# Patient Record
Sex: MALE | Race: WHITE | NOT HISPANIC OR LATINO
[De-identification: names, ages, dates, MRNs, and addresses within clinical notes are randomized per-mention and may not be internally consistent; named-entity substitution may affect disease eponyms.]

---

## 2017-02-09 ENCOUNTER — APPOINTMENT (OUTPATIENT)
Dept: HEMATOLOGY ONCOLOGY | Facility: CLINIC | Age: 77
End: 2017-02-09

## 2017-05-11 ENCOUNTER — APPOINTMENT (OUTPATIENT)
Dept: HEMATOLOGY ONCOLOGY | Facility: CLINIC | Age: 77
End: 2017-05-11

## 2017-05-11 VITALS
DIASTOLIC BLOOD PRESSURE: 83 MMHG | TEMPERATURE: 97.9 F | BODY MASS INDEX: 28.13 KG/M2 | HEART RATE: 74 BPM | SYSTOLIC BLOOD PRESSURE: 164 MMHG | RESPIRATION RATE: 14 BRPM | WEIGHT: 185 LBS

## 2017-05-12 LAB
ALBUMIN SERPL ELPH-MCNC: 4.5 G/DL
ALP BLD-CCNC: 65 U/L
ALT SERPL-CCNC: 47 U/L
ANION GAP SERPL CALC-SCNC: 14 MMOL/L
AST SERPL-CCNC: 37 U/L
BILIRUB SERPL-MCNC: 0.6 MG/DL
BUN SERPL-MCNC: 12 MG/DL
CALCIUM SERPL-MCNC: 9.3 MG/DL
CHLORIDE SERPL-SCNC: 100 MMOL/L
CO2 SERPL-SCNC: 25 MMOL/L
CREAT SERPL-MCNC: 1.06 MG/DL
GLUCOSE SERPL-MCNC: 90 MG/DL
LDH SERPL-CCNC: 180 U/L
POTASSIUM SERPL-SCNC: 4.7 MMOL/L
PROT SERPL-MCNC: 6.7 G/DL
SODIUM SERPL-SCNC: 139 MMOL/L
URATE SERPL-MCNC: 7.4 MG/DL

## 2017-05-15 LAB
DEPRECATED KAPPA LC FREE/LAMBDA SER: 1.3 RATIO
IGA SER QL IEP: 10 MG/DL
IGG SER QL IEP: 915 MG/DL
IGM SER QL IEP: 9 MG/DL
KAPPA LC CSF-MCNC: 0.54 MG/DL
KAPPA LC SERPL-MCNC: 0.7 MG/DL

## 2017-08-09 ENCOUNTER — LABORATORY RESULT (OUTPATIENT)
Age: 77
End: 2017-08-09

## 2017-08-09 ENCOUNTER — APPOINTMENT (OUTPATIENT)
Dept: HEMATOLOGY ONCOLOGY | Facility: CLINIC | Age: 77
End: 2017-08-09
Payer: MEDICARE

## 2017-08-09 PROCEDURE — 85025 COMPLETE CBC W/AUTO DIFF WBC: CPT

## 2017-08-09 PROCEDURE — 99215 OFFICE O/P EST HI 40 MIN: CPT

## 2017-08-10 LAB
ALBUMIN SERPL ELPH-MCNC: 4.5 G/DL
ALP BLD-CCNC: 58 U/L
ALT SERPL-CCNC: 162 U/L
ANION GAP SERPL CALC-SCNC: 14 MMOL/L
AST SERPL-CCNC: 98 U/L
BILIRUB SERPL-MCNC: 0.9 MG/DL
BUN SERPL-MCNC: 9 MG/DL
CALCIUM SERPL-MCNC: 8.7 MG/DL
CHLORIDE SERPL-SCNC: 103 MMOL/L
CO2 SERPL-SCNC: 26 MMOL/L
CREAT SERPL-MCNC: 1.15 MG/DL
GLUCOSE SERPL-MCNC: 71 MG/DL
LDH SERPL-CCNC: 229 U/L
POTASSIUM SERPL-SCNC: 4.5 MMOL/L
PROT SERPL-MCNC: 6.7 G/DL
SODIUM SERPL-SCNC: 143 MMOL/L
URATE SERPL-MCNC: 7.3 MG/DL

## 2017-08-11 VITALS
WEIGHT: 185 LBS | DIASTOLIC BLOOD PRESSURE: 76 MMHG | BODY MASS INDEX: 28.13 KG/M2 | RESPIRATION RATE: 12 BRPM | TEMPERATURE: 97.8 F | OXYGEN SATURATION: 99 % | SYSTOLIC BLOOD PRESSURE: 130 MMHG | HEART RATE: 93 BPM

## 2017-08-11 LAB
DEPRECATED KAPPA LC FREE/LAMBDA SER: 3 RATIO
IGA SER QL IEP: 8 MG/DL
IGG SER QL IEP: 793 MG/DL
IGM SER QL IEP: 8 MG/DL
KAPPA LC CSF-MCNC: 0.12 MG/DL
KAPPA LC SERPL-MCNC: 0.36 MG/DL

## 2017-08-14 ENCOUNTER — RX RENEWAL (OUTPATIENT)
Age: 77
End: 2017-08-14

## 2017-11-08 ENCOUNTER — APPOINTMENT (OUTPATIENT)
Dept: HEMATOLOGY ONCOLOGY | Facility: CLINIC | Age: 77
End: 2017-11-08
Payer: MEDICARE

## 2017-11-08 VITALS
RESPIRATION RATE: 12 BRPM | OXYGEN SATURATION: 98 % | BODY MASS INDEX: 27.37 KG/M2 | SYSTOLIC BLOOD PRESSURE: 125 MMHG | DIASTOLIC BLOOD PRESSURE: 64 MMHG | WEIGHT: 180 LBS | TEMPERATURE: 97.8 F | HEART RATE: 80 BPM

## 2017-11-08 PROCEDURE — 85025 COMPLETE CBC W/AUTO DIFF WBC: CPT

## 2017-11-08 PROCEDURE — 99215 OFFICE O/P EST HI 40 MIN: CPT

## 2017-11-08 RX ORDER — INDOMETHACIN 50 MG/1
50 CAPSULE ORAL
Refills: 0 | Status: ACTIVE | COMMUNITY

## 2017-11-09 LAB
ALBUMIN SERPL ELPH-MCNC: 4.7 G/DL
ALP BLD-CCNC: 61 U/L
ALT SERPL-CCNC: 38 U/L
ANION GAP SERPL CALC-SCNC: 15 MMOL/L
AST SERPL-CCNC: 33 U/L
BILIRUB SERPL-MCNC: 0.9 MG/DL
BUN SERPL-MCNC: 12 MG/DL
CALCIUM SERPL-MCNC: 9.3 MG/DL
CHLORIDE SERPL-SCNC: 104 MMOL/L
CO2 SERPL-SCNC: 24 MMOL/L
CREAT SERPL-MCNC: 1.27 MG/DL
GLUCOSE SERPL-MCNC: 79 MG/DL
LDH SERPL-CCNC: 200 U/L
POTASSIUM SERPL-SCNC: 4.8 MMOL/L
PROT SERPL-MCNC: 7 G/DL
SODIUM SERPL-SCNC: 143 MMOL/L
URATE SERPL-MCNC: 7.7 MG/DL

## 2017-11-10 LAB
DEPRECATED KAPPA LC FREE/LAMBDA SER: 1.21 RATIO
IGA SER QL IEP: 10 MG/DL
IGG SER QL IEP: 846 MG/DL
IGM SER QL IEP: 14 MG/DL
KAPPA LC CSF-MCNC: 0.73 MG/DL
KAPPA LC SERPL-MCNC: 0.88 MG/DL

## 2018-02-08 ENCOUNTER — APPOINTMENT (OUTPATIENT)
Dept: HEMATOLOGY ONCOLOGY | Facility: CLINIC | Age: 78
End: 2018-02-08
Payer: MEDICARE

## 2018-02-08 PROCEDURE — 99215 OFFICE O/P EST HI 40 MIN: CPT

## 2018-02-08 PROCEDURE — 85025 COMPLETE CBC W/AUTO DIFF WBC: CPT

## 2018-02-08 RX ORDER — ACYCLOVIR 400 MG/1
400 TABLET ORAL
Qty: 120 | Refills: 5 | Status: DISCONTINUED | COMMUNITY
Start: 2018-01-29 | End: 2018-02-08

## 2018-02-09 VITALS
BODY MASS INDEX: 27.37 KG/M2 | HEART RATE: 74 BPM | RESPIRATION RATE: 14 BRPM | TEMPERATURE: 97.6 F | DIASTOLIC BLOOD PRESSURE: 76 MMHG | SYSTOLIC BLOOD PRESSURE: 118 MMHG | WEIGHT: 180 LBS | OXYGEN SATURATION: 99 %

## 2018-02-09 LAB
DEPRECATED KAPPA LC FREE/LAMBDA SER: 0.96 RATIO
IGA SER QL IEP: 10 MG/DL
IGG SER QL IEP: 1090 MG/DL
IGM SER QL IEP: 10 MG/DL
KAPPA LC CSF-MCNC: 0.23 MG/DL
KAPPA LC SERPL-MCNC: 0.22 MG/DL
LDH SERPL-CCNC: 188 U/L
URATE SERPL-MCNC: 7.8 MG/DL

## 2018-02-15 ENCOUNTER — RX RENEWAL (OUTPATIENT)
Age: 78
End: 2018-02-15

## 2018-05-09 ENCOUNTER — APPOINTMENT (OUTPATIENT)
Dept: HEMATOLOGY ONCOLOGY | Facility: CLINIC | Age: 78
End: 2018-05-09
Payer: MEDICARE

## 2018-05-09 VITALS
SYSTOLIC BLOOD PRESSURE: 122 MMHG | BODY MASS INDEX: 27.98 KG/M2 | DIASTOLIC BLOOD PRESSURE: 72 MMHG | HEART RATE: 87 BPM | OXYGEN SATURATION: 98 % | WEIGHT: 184 LBS | RESPIRATION RATE: 14 BRPM | TEMPERATURE: 97.6 F

## 2018-05-09 PROCEDURE — 99215 OFFICE O/P EST HI 40 MIN: CPT

## 2018-05-09 PROCEDURE — 85025 COMPLETE CBC W/AUTO DIFF WBC: CPT

## 2018-05-14 LAB
ALBUMIN SERPL ELPH-MCNC: 4.4 G/DL
ALP BLD-CCNC: 64 U/L
ALT SERPL-CCNC: 69 U/L
ANION GAP SERPL CALC-SCNC: 12 MMOL/L
AST SERPL-CCNC: 64 U/L
BILIRUB SERPL-MCNC: 0.6 MG/DL
BUN SERPL-MCNC: 13 MG/DL
CALCIUM SERPL-MCNC: 9.5 MG/DL
CHLORIDE SERPL-SCNC: 104 MMOL/L
CO2 SERPL-SCNC: 25 MMOL/L
CREAT SERPL-MCNC: 1.14 MG/DL
GLUCOSE SERPL-MCNC: 90 MG/DL
LDH SERPL-CCNC: 191 U/L
POTASSIUM SERPL-SCNC: 4.8 MMOL/L
PROT SERPL-MCNC: 6.9 G/DL
SODIUM SERPL-SCNC: 141 MMOL/L
URATE SERPL-MCNC: 7.9 MG/DL

## 2018-08-15 ENCOUNTER — APPOINTMENT (OUTPATIENT)
Dept: HEMATOLOGY ONCOLOGY | Facility: CLINIC | Age: 78
End: 2018-08-15
Payer: MEDICARE

## 2018-08-15 DIAGNOSIS — C91.90 LYMPHOID LEUKEMIA, UNSPECIFIED NOT HAVING ACHIEVED REMISSION: ICD-10-CM

## 2018-08-15 PROCEDURE — 85025 COMPLETE CBC W/AUTO DIFF WBC: CPT

## 2018-08-20 LAB
ALBUMIN SERPL ELPH-MCNC: 4.4 G/DL
ALP BLD-CCNC: 72 U/L
ALT SERPL-CCNC: 79 U/L
ANION GAP SERPL CALC-SCNC: 12 MMOL/L
AST SERPL-CCNC: 62 U/L
BILIRUB SERPL-MCNC: 0.4 MG/DL
BUN SERPL-MCNC: 14 MG/DL
CALCIUM SERPL-MCNC: 8.9 MG/DL
CHLORIDE SERPL-SCNC: 105 MMOL/L
CO2 SERPL-SCNC: 26 MMOL/L
CREAT SERPL-MCNC: 1.03 MG/DL
DEPRECATED KAPPA LC FREE/LAMBDA SER: 7.46 RATIO
GLUCOSE SERPL-MCNC: 101 MG/DL
IGA SER QL IEP: 6 MG/DL
IGG SER QL IEP: 996 MG/DL
IGM SER QL IEP: 20 MG/DL
KAPPA LC CSF-MCNC: 0.24 MG/DL
KAPPA LC SERPL-MCNC: 1.79 MG/DL
LDH SERPL-CCNC: 169 U/L
POTASSIUM SERPL-SCNC: 4.6 MMOL/L
PROT SERPL-MCNC: 6.4 G/DL
SODIUM SERPL-SCNC: 143 MMOL/L
URATE SERPL-MCNC: 7.1 MG/DL

## 2018-11-07 ENCOUNTER — APPOINTMENT (OUTPATIENT)
Dept: HEMATOLOGY ONCOLOGY | Facility: CLINIC | Age: 78
End: 2018-11-07
Payer: MEDICARE

## 2018-11-07 VITALS
RESPIRATION RATE: 14 BRPM | SYSTOLIC BLOOD PRESSURE: 120 MMHG | DIASTOLIC BLOOD PRESSURE: 80 MMHG | HEART RATE: 91 BPM | OXYGEN SATURATION: 98 %

## 2018-11-07 PROCEDURE — 85025 COMPLETE CBC W/AUTO DIFF WBC: CPT

## 2018-11-07 PROCEDURE — 99215 OFFICE O/P EST HI 40 MIN: CPT

## 2018-11-09 NOTE — PHYSICAL EXAM
[Fully active, able to carry on all pre-disease performance without restriction] : Status 0 - Fully active, able to carry on all pre-disease performance without restriction [Normal] : affect appropriate [de-identified] : EOMI, anicteric sclera, conjunctiva normal [de-identified] : ESTRELLA [de-identified] : irregularly irregular [de-identified] : no c/c/e

## 2018-11-09 NOTE — ASSESSMENT
[FreeTextEntry1] : Mr. Houston is a 77 year old male with CLL (diagnosed 8/2005), on ibrutnib (start 8/2015), treatment naive, who presents for routine follow-up. He is in rate controlled atrial fibrilation and his ibrutinib has been on hold since 2/8/18 secondary to this.\par \par Plan:\par 1)CBC reviewed, counts overall stable, stable thrombocytopenia\par 2)feels well, no evidence of POD off therapy, will continue to monitor closely and hold ibrutinib\par 3)continue Eliquis and follow-up with cardiology as scheduled re: chelle. fib\par 4)continue monthly IVIG in [place] for hypogammaglobulinemia\par 5)History of skin cancer: Continue frequent dermatology follow-up in [place].\par 6)continue routine health maintenance and age appropriate screening as indicated\par 7)RTO in 6  months, sooner PRN

## 2018-11-09 NOTE — RESULTS/DATA
[FreeTextEntry1] : CBC done 18\par WBC: 6.6\par A.75\par ANC: 3.26\par Hgb: 13.8\par Hct: 41.1\par Plt: 100.0

## 2018-11-09 NOTE — HISTORY OF PRESENT ILLNESS
[Disease:__________________________] : Disease: [unfilled] [Cardiovascular] : Cardiovascular [Constitutional] : Constitutional [Endocrine] : Endocrine [Gastrointestinal] : Gastrointestinal [Genitourinary] : Genitourinary [Infectious] : Infectious [Musculoskeletal] : Musculoskeletal [Pain] : Pain [Pulmonary] : Pulmonary [Hematologic] : Hematologic [de-identified] : Mr. 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 His ibrutinib has been on hold since 2/8/18 secondary to atrial fibrillation.\par \par He reports feeling well overall today. No fevers or drenching night sweats. Good appetite. Weight overall stable. Normal bowel movements. No chest pain or shortness of breath. No palpitations. Energy excellent. No infections. No new LAD. He follows with his cardiologist regularly.  [de-identified] : unmutated\par CD38+, ZAP70+ \par nl FISH [FreeTextEntry1] : Prior Therapy:\par ibrutinib 8/2015-2/108 (on hold for a. fib)\par 4/07: Genitope vaccine trial\par FCR x 3 (5/10, 7/10, 12/10)-> clinical CR \par 5/12: Rituxan x 2 @ [facility], c/b tumor flare vs TLS.  ? ITP treated w steroids (off since 12/12)\par Current Tx: ibrutinib\par 8/13-2/14: Ofatumumab. Sabi x 1 added in 9/13.

## 2019-04-01 LAB
ALBUMIN SERPL ELPH-MCNC: 4.5 G/DL
ALP BLD-CCNC: 57 U/L
ALT SERPL-CCNC: 35 U/L
ANION GAP SERPL CALC-SCNC: 11 MMOL/L
AST SERPL-CCNC: 35 U/L
BILIRUB SERPL-MCNC: 0.5 MG/DL
BUN SERPL-MCNC: 13 MG/DL
CALCIUM SERPL-MCNC: 9.1 MG/DL
CHLORIDE SERPL-SCNC: 105 MMOL/L
CO2 SERPL-SCNC: 25 MMOL/L
CREAT SERPL-MCNC: 1.11 MG/DL
GLUCOSE SERPL-MCNC: 104 MG/DL
LDH SERPL-CCNC: 197 U/L
POTASSIUM SERPL-SCNC: 5.3 MMOL/L
PROT SERPL-MCNC: 6.7 G/DL
SODIUM SERPL-SCNC: 141 MMOL/L
URATE SERPL-MCNC: 7.3 MG/DL

## 2019-05-09 ENCOUNTER — APPOINTMENT (OUTPATIENT)
Dept: HEMATOLOGY ONCOLOGY | Facility: CLINIC | Age: 79
End: 2019-05-09
Payer: MEDICARE

## 2019-05-09 VITALS
DIASTOLIC BLOOD PRESSURE: 78 MMHG | WEIGHT: 184 LBS | HEART RATE: 88 BPM | OXYGEN SATURATION: 98 % | SYSTOLIC BLOOD PRESSURE: 130 MMHG | BODY MASS INDEX: 27.98 KG/M2

## 2019-05-09 PROCEDURE — 85025 COMPLETE CBC W/AUTO DIFF WBC: CPT

## 2019-05-09 PROCEDURE — 99215 OFFICE O/P EST HI 40 MIN: CPT

## 2019-05-09 NOTE — ASSESSMENT
[FreeTextEntry1] : Mr. 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 He is in rate controlled atrial fibrilation and his ibrutinib has been on hold since 2/8/18 secondary to this. He presents today for follow up.\par \par Plan:\par 1)CBC reviewed, WBC elevated, stable thrombocytopenia \par 2)will start patient on acalbrutinib 100 mg BID. Patient should have weekly cbc check for about 1 month. We will discuss with Dr. Deras in [place]\par 3)continue Eliquis and follow-up with cardiology as scheduled re: chelle. fib\par 4)continue monthly IVIG in [place] for hypogammaglobulinemia\par 5)History of skin cancer: Continue frequent dermatology follow-up in [place].\par 6)continue routine health maintenance and age appropriate screening as indicated\par 7)RTO in 3  months, sooner PRN\par \par Dr. Deras contact: [phone]

## 2019-05-09 NOTE — HISTORY OF PRESENT ILLNESS
[Disease:__________________________] : Disease: [unfilled] [Cardiovascular] : Cardiovascular [Gastrointestinal] : Gastrointestinal [Constitutional] : Constitutional [Endocrine] : Endocrine [Genitourinary] : Genitourinary [Infectious] : Infectious [Musculoskeletal] : Musculoskeletal [Pulmonary] : Pulmonary [Pain] : Pain [Hematologic] : Hematologic [de-identified] : Mr. 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 His ibrutinib has been on hold since 2/8/18 secondary to atrial fibrillation.\par \par He reports feeling well overall today. He notes his WBC is rising. But overall he feels very well. No fevers or drenching night sweats. Good appetite. Weight overall stable. Normal bowel movements. No chest pain or shortness of breath. No palpitations. Energy excellent. No infections. No new LAD.  [de-identified] : unmutated\par CD38+, ZAP70+ \par nl FISH [FreeTextEntry1] : Prior Therapy:\par ibrutinib 8/2015-2/108 (on hold for a. fib)\par 4/07: Genitope vaccine trial\par FCR x 3 (5/10, 7/10, 12/10)-> clinical CR \par 5/12: Rituxan x 2 @ [facility], c/b tumor flare vs TLS.  ? ITP treated w steroids (off since 12/12)\par Current Tx: ibrutinib\par 8/13-2/14: Ofatumumab. Sabi x 1 added in 9/13.

## 2019-05-09 NOTE — PHYSICAL EXAM
[Fully active, able to carry on all pre-disease performance without restriction] : Status 0 - Fully active, able to carry on all pre-disease performance without restriction [Normal] : affect appropriate [de-identified] : EOMI, anicteric sclera, conjunctiva normal [de-identified] : irregularly irregular [de-identified] : ESTRELLA [de-identified] : no c/c/e

## 2019-05-09 NOTE — RESULTS/DATA
[FreeTextEntry1] : CBC done 19:\par WBC: 14.4\par A.93\par ANC: 3.37\par Hgb: 13.2\par Hct: 39.6\par Plt: 78.0

## 2019-05-10 LAB
ALBUMIN SERPL ELPH-MCNC: 4.8 G/DL
ALP BLD-CCNC: 68 U/L
ALT SERPL-CCNC: 38 U/L
ANION GAP SERPL CALC-SCNC: 14 MMOL/L
AST SERPL-CCNC: 41 U/L
BILIRUB SERPL-MCNC: 0.7 MG/DL
BUN SERPL-MCNC: 14 MG/DL
CALCIUM SERPL-MCNC: 9.5 MG/DL
CHLORIDE SERPL-SCNC: 105 MMOL/L
CO2 SERPL-SCNC: 25 MMOL/L
CREAT SERPL-MCNC: 1.46 MG/DL
GLUCOSE SERPL-MCNC: 91 MG/DL
LDH SERPL-CCNC: 216 U/L
POTASSIUM SERPL-SCNC: 4.9 MMOL/L
PROT SERPL-MCNC: 6.7 G/DL
SODIUM SERPL-SCNC: 144 MMOL/L
URATE SERPL-MCNC: 9 MG/DL

## 2019-08-01 ENCOUNTER — RX RENEWAL (OUTPATIENT)
Age: 79
End: 2019-08-01

## 2019-09-10 ENCOUNTER — APPOINTMENT (OUTPATIENT)
Dept: HEMATOLOGY ONCOLOGY | Facility: CLINIC | Age: 79
End: 2019-09-10
Payer: MEDICARE

## 2019-09-10 PROCEDURE — 85025 COMPLETE CBC W/AUTO DIFF WBC: CPT

## 2019-09-10 PROCEDURE — 99214 OFFICE O/P EST MOD 30 MIN: CPT

## 2019-09-11 LAB
ALBUMIN SERPL ELPH-MCNC: 4.6 G/DL
ALP BLD-CCNC: 90 U/L
ALT SERPL-CCNC: 23 U/L
ANION GAP SERPL CALC-SCNC: 9 MMOL/L
AST SERPL-CCNC: 28 U/L
BILIRUB SERPL-MCNC: 0.4 MG/DL
BUN SERPL-MCNC: 16 MG/DL
CALCIUM SERPL-MCNC: 9.8 MG/DL
CHLORIDE SERPL-SCNC: 102 MMOL/L
CO2 SERPL-SCNC: 29 MMOL/L
CREAT SERPL-MCNC: 1.13 MG/DL
GLUCOSE SERPL-MCNC: 112 MG/DL
LDH SERPL-CCNC: 190 U/L
POTASSIUM SERPL-SCNC: 4.8 MMOL/L
PROT SERPL-MCNC: 6.7 G/DL
SODIUM SERPL-SCNC: 140 MMOL/L
URATE SERPL-MCNC: 7.7 MG/DL

## 2019-09-12 NOTE — HISTORY OF PRESENT ILLNESS
[Disease:__________________________] : Disease: [unfilled] [Cardiovascular] : Cardiovascular [Constitutional] : Constitutional [Endocrine] : Endocrine [Gastrointestinal] : Gastrointestinal [Genitourinary] : Genitourinary [Infectious] : Infectious [Musculoskeletal] : Musculoskeletal [Pain] : Pain [Hematologic] : Hematologic [Pulmonary] : Pulmonary [de-identified] : Mr. 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 His ibrutinib has been on hold since 2/8/18 secondary to atrial fibrillation. He has been on acalabrutinib 100 mg BID since 6/2019.\par \par He had a tooth implant done on 9/4/19. He states he had off/on bleeding and did hold the acalabrutinib 3 days prior and 3 days after the tooth implant. He still has some bleeding. He also is on eliquis for afib. No fevers or drenching night sweats. Good appetite. Weight overall stable. Normal bowel movements. No chest pain or shortness of breath. No palpitations. No infections. No new LAD.  [de-identified] : unmutated\par CD38+, ZAP70+ \par nl FISH [FreeTextEntry1] : \par Current treatment: acalbrutinib 100 mg x BID\par Prior Therapy:\par ibrutinib 8/2015-2/108 (on hold for a. fib)\par 4/07: Genitope vaccine trial\par FCR x 3 (5/10, 7/10, 12/10)-> clinical CR \par 5/12: Rituxan x 2 @ [facility], c/b tumor flare vs TLS.  ? ITP treated w steroids (off since 12/12)\par Current Tx: ibrutinib\par 8/13-2/14: Ofatumumab. Sabi x 1 added in 9/13.

## 2019-09-12 NOTE — RESULTS/DATA
[FreeTextEntry1] : CBC done 9/10/19:\par WBC: 32.8\par A.25\par ANC: 6.50\par Hgb: 12.5\par Hct: 38.3\par Plt: 126.0

## 2019-09-12 NOTE — ASSESSMENT
[FreeTextEntry1] : Mr. 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 He is in rate controlled atrial fibrilation and his ibrutinib has been on hold since 2/8/18 secondary to this. He was started on acalabrutinib 100 mg BID 6/2019. Locally he see's Dr. Deras his hematology oncologist in [place].  He presents today for follow up.\par \par Plan:\par 1)CBC reviewed, counts stable. Previous WBC at Dr. Deras office was in the 50k's about 2 weeks ago-see scanned reports for details.\par 2)due to tooth bleeding, advised patient to hold acalabrutibib and eliquis for 1 week and call us to inform if bleeding stopped. Also advised patient to follow up with dentist.\par 3) follow-up with cardiology as scheduled re: a. fib\par 4)continue monthly IVIG in [place] for hypogammaglobulinemia\par 5)History of skin cancer: Continue frequent dermatology follow-up in [place].\par 6)continue routine health maintenance and age appropriate screening as indicated\par 7)RTO in 3  months, sooner PRN\par \par Dr. Deras contact: [phone]

## 2019-09-12 NOTE — PHYSICAL EXAM
[Fully active, able to carry on all pre-disease performance without restriction] : Status 0 - Fully active, able to carry on all pre-disease performance without restriction [Normal] : affect appropriate [de-identified] : EOMI, anicteric sclera, conjunctiva normal [de-identified] : ESTRELLA [de-identified] : irregularly irregular [de-identified] : no c/c/e

## 2020-01-06 ENCOUNTER — APPOINTMENT (OUTPATIENT)
Dept: HEMATOLOGY ONCOLOGY | Facility: CLINIC | Age: 80
End: 2020-01-06
Payer: MEDICARE

## 2020-01-06 VITALS
WEIGHT: 178 LBS | BODY MASS INDEX: 27.07 KG/M2 | OXYGEN SATURATION: 97 % | HEART RATE: 65 BPM | RESPIRATION RATE: 18 BRPM

## 2020-01-06 VITALS — DIASTOLIC BLOOD PRESSURE: 78 MMHG | SYSTOLIC BLOOD PRESSURE: 128 MMHG

## 2020-01-06 PROCEDURE — 99214 OFFICE O/P EST MOD 30 MIN: CPT

## 2020-01-06 PROCEDURE — 85025 COMPLETE CBC W/AUTO DIFF WBC: CPT

## 2020-01-06 NOTE — RESULTS/DATA
[FreeTextEntry1] : CBC done 2020:\par WBC: 26.1\par A.19\par ANC: 2.85\par Hgb: 14.4\par Hct: 42.8\par Plt: 122

## 2020-01-06 NOTE — PHYSICAL EXAM
[Fully active, able to carry on all pre-disease performance without restriction] : Status 0 - Fully active, able to carry on all pre-disease performance without restriction [Normal] : affect appropriate [de-identified] : ESTRELLA [de-identified] : EOMI, anicteric sclera, conjunctiva normal [de-identified] : irregularly irregular [de-identified] : no c/c/e

## 2020-01-06 NOTE — ASSESSMENT
[FreeTextEntry1] : Mr. 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 He is in rate controlled atrial fibrilation and his ibrutinib has been on hold since 2/8/18 secondary to this. He was started on acalabrutinib 100 mg BID 6/2019. Locally he see's Dr. Deras his hematology oncologist in [place].  He presents today for follow up.\par \par Plan:\par 1)CBC reviewed, counts stable.\par 2) continue with acabrutinib.  Will check flow cytometry and FISH at next visit.\par 3) follow-up with cardiology as scheduled re: a. fib\par 4)continue monthly IVIG in [place] for hypogammaglobulinemia\par 5)History of skin cancer: Continue frequent dermatology follow-up in [place].\par 6)continue routine health maintenance and age appropriate screening as indicated\par 7)RTO in 3  months, sooner PRN\par \par Dr. Deras contact: [phone]

## 2020-01-06 NOTE — HISTORY OF PRESENT ILLNESS
[Disease:__________________________] : Disease: [unfilled] [Cardiovascular] : Cardiovascular [Constitutional] : Constitutional [Endocrine] : Endocrine [Gastrointestinal] : Gastrointestinal [Musculoskeletal] : Musculoskeletal [Genitourinary] : Genitourinary [Infectious] : Infectious [Pain] : Pain [Pulmonary] : Pulmonary [Hematologic] : Hematologic [de-identified] : Mr. 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 His ibrutinib has been on hold since 2/8/18 secondary to atrial fibrillation. He has been on acalabrutinib 100 mg BID since 6/2019.\par \par Reports tooth bleeding has resolved. No fevers or drenching night sweats. Good appetite. Weight overall stable. Normal bowel movements. No chest pain or shortness of breath. No palpitations. No infections. No new LAD.  [FreeTextEntry1] : \par Current treatment: acalbrutinib 100 mg x BID\par Prior Therapy:\par ibrutinib 8/2015-2/108 (on hold for a. fib)\par 4/07: Genitope vaccine trial\par FCR x 3 (5/10, 7/10, 12/10)-> clinical CR \par 5/12: Rituxan x 2 @ [facility], c/b tumor flare vs TLS.  ? ITP treated w steroids (off since 12/12)\par Current Tx: ibrutinib\par 8/13-2/14: Ofatumumab. Sabi x 1 added in 9/13.  [de-identified] : unmutated\par CD38+, ZAP70+ \par nl FISH

## 2020-01-07 LAB
ALBUMIN SERPL ELPH-MCNC: 5.2 G/DL
ALP BLD-CCNC: 77 U/L
ALT SERPL-CCNC: 47 U/L
ANION GAP SERPL CALC-SCNC: 14 MMOL/L
AST SERPL-CCNC: 39 U/L
BILIRUB SERPL-MCNC: 0.6 MG/DL
BUN SERPL-MCNC: 14 MG/DL
CALCIUM SERPL-MCNC: 9.7 MG/DL
CHLORIDE SERPL-SCNC: 102 MMOL/L
CO2 SERPL-SCNC: 27 MMOL/L
CREAT SERPL-MCNC: 1.02 MG/DL
GLUCOSE SERPL-MCNC: 71 MG/DL
LDH SERPL-CCNC: 179 U/L
POTASSIUM SERPL-SCNC: 4.5 MMOL/L
PROT SERPL-MCNC: 7.2 G/DL
SODIUM SERPL-SCNC: 143 MMOL/L
URATE SERPL-MCNC: 7.2 MG/DL

## 2020-05-21 ENCOUNTER — APPOINTMENT (OUTPATIENT)
Dept: HEMATOLOGY ONCOLOGY | Facility: CLINIC | Age: 80
End: 2020-05-21
Payer: MEDICARE

## 2020-05-21 PROCEDURE — 99442: CPT | Mod: 95

## 2020-05-29 ENCOUNTER — RX RENEWAL (OUTPATIENT)
Age: 80
End: 2020-05-29

## 2020-09-14 ENCOUNTER — APPOINTMENT (OUTPATIENT)
Dept: HEMATOLOGY ONCOLOGY | Facility: CLINIC | Age: 80
End: 2020-09-14
Payer: MEDICARE

## 2020-09-14 PROCEDURE — 99442: CPT | Mod: 95

## 2020-12-04 RX ORDER — ACALABRUTINIB 100 MG/1
100 CAPSULE, GELATIN COATED ORAL
Qty: 90 | Refills: 1 | Status: DISCONTINUED | COMMUNITY
Start: 2019-05-09 | End: 2020-12-04

## 2021-05-03 ENCOUNTER — RX RENEWAL (OUTPATIENT)
Age: 81
End: 2021-05-03

## 2021-07-20 ENCOUNTER — RX RENEWAL (OUTPATIENT)
Age: 81
End: 2021-07-20

## 2021-10-01 ENCOUNTER — NON-APPOINTMENT (OUTPATIENT)
Age: 81
End: 2021-10-01

## 2021-10-08 ENCOUNTER — LABORATORY RESULT (OUTPATIENT)
Age: 81
End: 2021-10-08

## 2021-10-08 ENCOUNTER — APPOINTMENT (OUTPATIENT)
Dept: HEMATOLOGY ONCOLOGY | Facility: CLINIC | Age: 81
End: 2021-10-08
Payer: MEDICARE

## 2021-10-08 VITALS
SYSTOLIC BLOOD PRESSURE: 132 MMHG | HEART RATE: 77 BPM | OXYGEN SATURATION: 98 % | DIASTOLIC BLOOD PRESSURE: 72 MMHG | TEMPERATURE: 98.5 F | RESPIRATION RATE: 17 BRPM

## 2021-10-08 PROCEDURE — 85025 COMPLETE CBC W/AUTO DIFF WBC: CPT

## 2021-10-08 PROCEDURE — 99213 OFFICE O/P EST LOW 20 MIN: CPT

## 2021-10-08 RX ORDER — APIXABAN 2.5 MG/1
2.5 TABLET, FILM COATED ORAL
Refills: 0 | Status: COMPLETED | COMMUNITY
End: 2021-10-08

## 2021-10-08 RX ORDER — APIXABAN 5 MG/1
5 TABLET, FILM COATED ORAL
Qty: 180 | Refills: 0 | Status: ACTIVE | COMMUNITY
Start: 2021-10-08

## 2021-10-08 RX ORDER — SULFAMETHOXAZOLE AND TRIMETHOPRIM 800; 160 MG/1; MG/1
800-160 TABLET ORAL
Qty: 14 | Refills: 3 | Status: COMPLETED | COMMUNITY
Start: 2017-02-03 | End: 2021-10-08

## 2021-10-08 RX ORDER — ATENOLOL 50 MG/1
50 TABLET ORAL
Refills: 0 | Status: ACTIVE | COMMUNITY

## 2021-10-08 NOTE — ASSESSMENT
[FreeTextEntry1] : Mr. 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 He is in rate controlled atrial fibrilation and his ibrutinib has been on hold since 2/8/18 secondary to this. He was started on acalabrutinib 100 mg BID 6/2019.  In March of 2020 patient stopped acalabrutinib when he developed a rash. He restarted acalabrutinib at full dose in 4/2021. He presents today for follow up. Locally he see's Dr. Deras his hematology oncologist in [place].  He presents today for follow up.\par \par Plan:\par 1)CBC reviewed, counts stable.\par 2) continue with acabrutinib.  Will f/u flow cytometry and FISH drawn today\par 3) follow-up with cardiology as scheduled re: a. fib\par 4)continue monthly IVIG in [place] for hypogammaglobulinemia\par 5)History of skin cancer: Continue frequent dermatology follow-up in [place].\par 6)continue routine health maintenance and age appropriate screening as indicated\par 7)RTO 6 months, sooner PRN\par \par Dr. Deras contact: [phone]

## 2021-10-08 NOTE — RESULTS/DATA
[FreeTextEntry1] : CBC done locally \par WBC: 14.1\par A.75\par ANC: 1.80\par Hgb: 12.0\par Hct: 38.1\par Plt: 107.0

## 2021-10-08 NOTE — HISTORY OF PRESENT ILLNESS
[Disease:__________________________] : Disease: [unfilled] [Cardiovascular] : Cardiovascular [Constitutional] : Constitutional [Endocrine] : Endocrine [Gastrointestinal] : Gastrointestinal [Genitourinary] : Genitourinary [Infectious] : Infectious [Musculoskeletal] : Musculoskeletal [Pain] : Pain [Pulmonary] : Pulmonary [Hematologic] : Hematologic [de-identified] : Mr. Houston is  a 81 year old male with CLL (diagnosed 8/2005), on ibrutinib since 8/2015, who presents for routine follow-up. His ibrutinib has been on hold since 2/8/18 secondary to atrial fibrillation. He has been on acalabrutinib 100 mg BID since 6/2019. In March of 2020 patient stopped acalabrutinib when he developed a rash. He restarted acalabrutinib at full dose in 4/2021. He presents today for follow up.\par \par  He recently started on otc nicolidimide by his dermatologist. He continues to see his local heme/onc Dr. Deras for monthly IVIG and follow up [place]. No fevers or drenching night sweats. Good appetite. Weight overall stable. Normal bowel movements. No chest pain or shortness of breath. No palpitations. No infections. No new LAD.  [de-identified] : unmutated\par CD38+, ZAP70+ \par nl FISH [FreeTextEntry1] : \par Current treatment: acalbrutinib 100 mg x BID\par Prior Therapy:\par ibrutinib 8/2015-2/108 (on hold for a. fib)\par 4/07: Genitope vaccine trial\par FCR x 3 (5/10, 7/10, 12/10)-> clinical CR \par 5/12: Rituxan x 2 @ [facility], c/b tumor flare vs TLS.  ? ITP treated w steroids (off since 12/12)\par Current Tx: ibrutinib\par 8/13-2/14: Ofatumumab. Sabi x 1 added in 9/13.

## 2021-10-08 NOTE — PHYSICAL EXAM
[Fully active, able to carry on all pre-disease performance without restriction] : Status 0 - Fully active, able to carry on all pre-disease performance without restriction [Normal] : affect appropriate [de-identified] : EOMI, anicteric sclera, conjunctiva normal [de-identified] : ESTRELLA [de-identified] : irregularly irregular [de-identified] : no c/c/e

## 2021-10-11 LAB
ALBUMIN SERPL ELPH-MCNC: 5 G/DL
ALP BLD-CCNC: 89 U/L
ALT SERPL-CCNC: 62 U/L
ANION GAP SERPL CALC-SCNC: 12 MMOL/L
AST SERPL-CCNC: 45 U/L
BILIRUB SERPL-MCNC: 0.8 MG/DL
BUN SERPL-MCNC: 13 MG/DL
CALCIUM SERPL-MCNC: 9.2 MG/DL
CHLORIDE SERPL-SCNC: 99 MMOL/L
CO2 SERPL-SCNC: 25 MMOL/L
CREAT SERPL-MCNC: 1.21 MG/DL
GLUCOSE SERPL-MCNC: 94 MG/DL
LDH SERPL-CCNC: 158 U/L
MAGNESIUM SERPL-MCNC: 2.1 MG/DL
PHOSPHATE SERPL-MCNC: 4.2 MG/DL
POTASSIUM SERPL-SCNC: 4.9 MMOL/L
PROT SERPL-MCNC: 7.7 G/DL
SODIUM SERPL-SCNC: 136 MMOL/L
URATE SERPL-MCNC: 7.1 MG/DL

## 2021-10-22 ENCOUNTER — RX RENEWAL (OUTPATIENT)
Age: 81
End: 2021-10-22

## 2022-01-27 ENCOUNTER — RX RENEWAL (OUTPATIENT)
Age: 82
End: 2022-01-27

## 2022-03-18 ENCOUNTER — RX RENEWAL (OUTPATIENT)
Age: 82
End: 2022-03-18

## 2022-04-13 ENCOUNTER — APPOINTMENT (OUTPATIENT)
Dept: HEMATOLOGY ONCOLOGY | Facility: CLINIC | Age: 82
End: 2022-04-13
Payer: MEDICARE

## 2022-04-13 PROCEDURE — 99213 OFFICE O/P EST LOW 20 MIN: CPT | Mod: 95

## 2022-04-13 NOTE — ASSESSMENT
[FreeTextEntry1] : Mr. 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 He is in rate controlled atrial fibrilation and his ibrutinib has been on hold since 2/8/18 secondary to this. He was started on acalabrutinib 100 mg BID 6/2019.  In March of 2020 patient stopped acalabrutinib when he developed a rash. He restarted acalabrutinib at full dose in 4/2021. He presents today for follow up. Locally he see's Dr. Deras his hematology oncologist in [place].  He presents today for follow up.\par \par Plan:\par 1)CBC reviewed, counts stable.\par 2) continue with acabrutinib. 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 fib\par 4)continue monthly IVIG in [place] for hypogammaglobulinemia\par 5)History of skin cancer: Continue frequent dermatology follow-up in [place].\par 6)continue routine health maintenance and age appropriate screening as indicated\par 7)RTO 6 months, sooner PRN\par \par Dr. Deras contact: [phone]

## 2022-04-13 NOTE — HISTORY OF PRESENT ILLNESS
[Disease:__________________________] : Disease: [unfilled] [Cardiovascular] : Cardiovascular [Constitutional] : Constitutional [Endocrine] : Endocrine [Gastrointestinal] : Gastrointestinal [Genitourinary] : Genitourinary [Infectious] : Infectious [Musculoskeletal] : Musculoskeletal [Pain] : Pain [Pulmonary] : Pulmonary [Hematologic] : Hematologic [de-identified] : Mr. Houston is  a 81 year old male with CLL (diagnosed 8/2005), on ibrutinib since 8/2015, who presents for routine follow-up. His ibrutinib has been on hold since 2/8/18 secondary to atrial fibrillation. He has been on acalabrutinib 100 mg BID since 6/2019. In March of 2020 patient stopped acalabrutinib when he developed a rash. He restarted acalabrutinib at full dose in 4/2021. He presents today for follow up via [facility] \par \par . He continues to see his local heme/onc Dr. Deras for monthly IVIG and follow up [place]. No fevers or drenching night sweats. Good appetite. Weight overall stable. Normal bowel movements. No chest pain or shortness of breath. No palpitations. No infections. No new LAD. Pt has recived 2 doses of the Evusheld [de-identified] : unmutated\par CD38+, ZAP70+ \par nl FISH [FreeTextEntry1] : \par Current treatment: acalbrutinib 100 mg x BID\par Prior Therapy:\par ibrutinib 8/2015-2/108 (on hold for a. fib)\par 4/07: Genitope vaccine trial\par FCR x 3 (5/10, 7/10, 12/10)-> clinical CR \par 5/12: Rituxan x 2 @ [facility], c/b tumor flare vs TLS.  ? ITP treated w steroids (off since 12/12)\par Current Tx: ibrutinib\par 8/13-2/14: Ofatumumab. Sabi x 1 added in 9/13.

## 2022-04-13 NOTE — REASON FOR VISIT
[Home] : at home, [unfilled] , at the time of the visit. [Medical Office: ([facility])___] : at the medical office located in  [Verbal consent obtained from patient] : the patient, [unfilled] [Follow-Up Visit] : a follow-up visit for [CLL] : chronic lymphocytic leukemia [Spouse] : spouse

## 2022-04-13 NOTE — PHYSICAL EXAM
[Fully active, able to carry on all pre-disease performance without restriction] : Status 0 - Fully active, able to carry on all pre-disease performance without restriction [Normal] : affect appropriate [de-identified] : speaking in full sentences, normal respiration on video call [de-identified] : AAOx3

## 2022-06-14 ENCOUNTER — NON-APPOINTMENT (OUTPATIENT)
Age: 82
End: 2022-06-14

## 2022-12-08 ENCOUNTER — RX RENEWAL (OUTPATIENT)
Age: 82
End: 2022-12-08

## 2023-01-10 ENCOUNTER — OUTPATIENT (OUTPATIENT)
Dept: OUTPATIENT SERVICES | Facility: HOSPITAL | Age: 83
LOS: 1 days | Discharge: HOME | End: 2023-01-10

## 2023-01-10 DIAGNOSIS — D64.9 ANEMIA, UNSPECIFIED: ICD-10-CM

## 2023-01-13 ENCOUNTER — APPOINTMENT (OUTPATIENT)
Dept: HEMATOLOGY ONCOLOGY | Facility: CLINIC | Age: 83
End: 2023-01-13
Payer: MEDICARE

## 2023-01-13 ENCOUNTER — RESULT REVIEW (OUTPATIENT)
Age: 83
End: 2023-01-13

## 2023-01-13 VITALS
SYSTOLIC BLOOD PRESSURE: 148 MMHG | OXYGEN SATURATION: 98 % | BODY MASS INDEX: 28.56 KG/M2 | RESPIRATION RATE: 16 BRPM | TEMPERATURE: 97 F | DIASTOLIC BLOOD PRESSURE: 71 MMHG | HEART RATE: 90 BPM | WEIGHT: 187.83 LBS

## 2023-01-13 LAB
BASOPHILS # BLD AUTO: 0 K/UL — SIGNIFICANT CHANGE UP (ref 0–0.2)
BASOPHILS NFR BLD AUTO: 0 % — SIGNIFICANT CHANGE UP (ref 0–2)
EOSINOPHIL # BLD AUTO: 0.36 K/UL — SIGNIFICANT CHANGE UP (ref 0–0.5)
EOSINOPHIL NFR BLD AUTO: 2 % — SIGNIFICANT CHANGE UP (ref 0–6)
HCT VFR BLD CALC: 39.7 % — SIGNIFICANT CHANGE UP (ref 39–50)
HGB BLD-MCNC: 12.8 G/DL — LOW (ref 13–17)
LYMPHOCYTES # BLD AUTO: 12.71 K/UL — HIGH (ref 1–3.3)
LYMPHOCYTES # BLD AUTO: 70 % — HIGH (ref 13–44)
MCHC RBC-ENTMCNC: 32.2 G/DL — SIGNIFICANT CHANGE UP (ref 32–36)
MCHC RBC-ENTMCNC: 34.4 PG — HIGH (ref 27–34)
MCV RBC AUTO: 106.7 FL — HIGH (ref 80–100)
MONOCYTES # BLD AUTO: 0.54 K/UL — SIGNIFICANT CHANGE UP (ref 0–0.9)
MONOCYTES NFR BLD AUTO: 3 % — SIGNIFICANT CHANGE UP (ref 2–14)
NEUTROPHILS # BLD AUTO: 4.54 K/UL — SIGNIFICANT CHANGE UP (ref 1.8–7.4)
NEUTROPHILS NFR BLD AUTO: 25 % — LOW (ref 43–77)
NRBC # BLD: 0 /100 — SIGNIFICANT CHANGE UP (ref 0–0)
NRBC # BLD: SIGNIFICANT CHANGE UP /100 WBCS (ref 0–0)
PLAT MORPH BLD: NORMAL — SIGNIFICANT CHANGE UP
PLATELET # BLD AUTO: 77 K/UL — LOW (ref 150–400)
RBC # BLD: 3.72 M/UL — LOW (ref 4.2–5.8)
RBC # FLD: 13.7 % — SIGNIFICANT CHANGE UP (ref 10.3–14.5)
RBC BLD AUTO: SIGNIFICANT CHANGE UP
WBC # BLD: 18.16 K/UL — HIGH (ref 3.8–10.5)
WBC # FLD AUTO: 18.16 K/UL — HIGH (ref 3.8–10.5)

## 2023-01-13 PROCEDURE — 99213 OFFICE O/P EST LOW 20 MIN: CPT

## 2023-01-13 NOTE — HISTORY OF PRESENT ILLNESS
[Disease:__________________________] : Disease: [unfilled] [Cardiovascular] : Cardiovascular [Constitutional] : Constitutional [Endocrine] : Endocrine [Gastrointestinal] : Gastrointestinal [Genitourinary] : Genitourinary [Infectious] : Infectious [Musculoskeletal] : Musculoskeletal [Pain] : Pain [Pulmonary] : Pulmonary [Hematologic] : Hematologic [de-identified] : Mr. Houston is  a 82 year old male with CLL (diagnosed 8/2005), on ibrutinib since 8/2015, who presents for routine follow-up. His ibrutinib has been on hold since 2/8/18 secondary to atrial fibrillation. He has been on acalabrutinib 100 mg BID since 6/2019. In March of 2020 patient stopped acalabrutinib when he developed a rash. He restarted acalabrutinib at full dose in 4/2021. He presents today for follow up \par \par  He continues to see his local heme/onc Dr. Deras for monthly IVIG and follow up [place]. No fevers or drenching night sweats. Good appetite. Weight overall stable. Pt states he has noticed more frequent BM - no recent GI check up. No chest pain or shortness of breath. No palpitations - does f/u with cardiology for hx of a fib. No infections. No new LAD. Pt has recived 2 doses of the Evusheld.\par  [de-identified] : unmutated\par CD38+, ZAP70+ \par nl FISH [FreeTextEntry1] : \par Current treatment: acalbrutinib 100 mg x BID\par Prior Therapy:\par ibrutinib 8/2015-2/108 (on hold for a. fib)\par 4/07: Genitope vaccine trial\par FCR x 3 (5/10, 7/10, 12/10)-> clinical CR \par 5/12: Rituxan x 2 @ [facility], c/b tumor flare vs TLS.  ? ITP treated w steroids (off since 12/12)\par Current Tx: ibrutinib\par 8/13-2/14: Ofatumumab. Sabi x 1 added in 9/13.

## 2023-01-13 NOTE — RESULTS/DATA
[FreeTextEntry1] : CBC with diff done 1/13/23\par wbc- 18.16\par hgb- 12.8\par hct- 39.7\par plt - 77\par alc- 12.71\par anc- 4.54

## 2023-01-13 NOTE — PHYSICAL EXAM
[Fully active, able to carry on all pre-disease performance without restriction] : Status 0 - Fully active, able to carry on all pre-disease performance without restriction [Normal] : affect appropriate [de-identified] : speaking in full sentences, normal respiration on video call [de-identified] : AAOx3

## 2023-01-13 NOTE — ASSESSMENT
[FreeTextEntry1] : Mr. Houston is a 82 year old male with CLL (diagnosed 8/2005), on ibrutnib (start 8/2015-2/2018). He is in rate controlled atrial fibrilation and his ibrutinib has been on hold since 2/8/18 secondary to this. He was started on acalabrutinib 100 mg BID 6/2019.  In March of 2020 patient stopped acalabrutinib when he developed a rash. He restarted acalabrutinib at full dose in 4/2021. He presents today for follow up. Locally he see's Dr. Deras his hematology oncologist in [place].  He presents today for follow up.\par \par Plan:\par 1)CBC reviewed; rising WBC/ALC since 7/2022; now WBC at 18k and ALC at 12.71\par -will send out labs for BTK resistance and flow cytometry\par - pt encouraged to f/u with GI for increase in number of bowel movements, no diarrhea\par \par 2) continue with acabrutinib. 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 Deras contact: [phone]

## 2023-01-17 LAB
ALBUMIN SERPL ELPH-MCNC: 4.7 G/DL
ALP BLD-CCNC: 57 U/L
ALT SERPL-CCNC: 37 U/L
ANION GAP SERPL CALC-SCNC: 13 MMOL/L
AST SERPL-CCNC: 42 U/L
BILIRUB SERPL-MCNC: 0.8 MG/DL
BUN SERPL-MCNC: 16 MG/DL
CALCIUM SERPL-MCNC: 9.3 MG/DL
CHLORIDE SERPL-SCNC: 100 MMOL/L
CO2 SERPL-SCNC: 22 MMOL/L
CREAT SERPL-MCNC: 1.16 MG/DL
DEPRECATED KAPPA LC FREE/LAMBDA SER: 11.87 RATIO
EGFR: 63 ML/MIN/1.73M2
GLUCOSE SERPL-MCNC: 132 MG/DL
IGA SER QL IEP: 7 MG/DL
IGG SER QL IEP: 785 MG/DL
IGM SER QL IEP: 13 MG/DL
KAPPA LC CSF-MCNC: 0.38 MG/DL
KAPPA LC SERPL-MCNC: 4.51 MG/DL
LDH SERPL-CCNC: 192 U/L
MAGNESIUM SERPL-MCNC: 1.9 MG/DL
PHOSPHATE SERPL-MCNC: 3.7 MG/DL
POTASSIUM SERPL-SCNC: 4.5 MMOL/L
PROT SERPL-MCNC: 6.5 G/DL
SODIUM SERPL-SCNC: 135 MMOL/L
URATE SERPL-MCNC: 7.4 MG/DL

## 2023-02-06 RX ORDER — ACALABRUTINIB 100 MG/1
100 CAPSULE, GELATIN COATED ORAL
Qty: 90 | Refills: 3 | Status: DISCONTINUED | COMMUNITY
Start: 2020-12-04 | End: 2023-02-06

## 2023-04-17 ENCOUNTER — OUTPATIENT (OUTPATIENT)
Dept: OUTPATIENT SERVICES | Facility: HOSPITAL | Age: 83
LOS: 1 days | Discharge: HOME | End: 2023-04-17

## 2023-04-17 DIAGNOSIS — D64.9 ANEMIA, UNSPECIFIED: ICD-10-CM

## 2023-04-19 ENCOUNTER — LABORATORY RESULT (OUTPATIENT)
Age: 83
End: 2023-04-19

## 2023-04-19 ENCOUNTER — RESULT REVIEW (OUTPATIENT)
Age: 83
End: 2023-04-19

## 2023-04-19 ENCOUNTER — APPOINTMENT (OUTPATIENT)
Dept: HEMATOLOGY ONCOLOGY | Facility: CLINIC | Age: 83
End: 2023-04-19
Payer: MEDICARE

## 2023-04-19 VITALS
OXYGEN SATURATION: 98 % | HEIGHT: 67.99 IN | DIASTOLIC BLOOD PRESSURE: 73 MMHG | WEIGHT: 183.65 LBS | HEART RATE: 82 BPM | RESPIRATION RATE: 16 BRPM | BODY MASS INDEX: 27.83 KG/M2 | TEMPERATURE: 98 F | SYSTOLIC BLOOD PRESSURE: 158 MMHG

## 2023-04-19 LAB
ANISOCYTOSIS BLD QL: SLIGHT — SIGNIFICANT CHANGE UP
BASOPHILS # BLD AUTO: 0 K/UL — SIGNIFICANT CHANGE UP (ref 0–0.2)
BASOPHILS NFR BLD AUTO: 0 % — SIGNIFICANT CHANGE UP (ref 0–2)
EOSINOPHIL # BLD AUTO: 0 K/UL — SIGNIFICANT CHANGE UP (ref 0–0.5)
EOSINOPHIL NFR BLD AUTO: 0 % — SIGNIFICANT CHANGE UP (ref 0–6)
HCT VFR BLD CALC: 39.9 % — SIGNIFICANT CHANGE UP (ref 39–50)
HGB BLD-MCNC: 12.6 G/DL — LOW (ref 13–17)
LYMPHOCYTES # BLD AUTO: 35.38 K/UL — HIGH (ref 1–3.3)
LYMPHOCYTES # BLD AUTO: 80 % — HIGH (ref 13–44)
LYMPHOCYTES # SPEC AUTO: 10 % — HIGH (ref 0–0)
MACROCYTES BLD QL: SLIGHT — SIGNIFICANT CHANGE UP
MCHC RBC-ENTMCNC: 31.6 G/DL — LOW (ref 32–36)
MCHC RBC-ENTMCNC: 33.3 PG — SIGNIFICANT CHANGE UP (ref 27–34)
MCV RBC AUTO: 105.6 FL — HIGH (ref 80–100)
MONOCYTES # BLD AUTO: 0.44 K/UL — SIGNIFICANT CHANGE UP (ref 0–0.9)
MONOCYTES NFR BLD AUTO: 1 % — LOW (ref 2–14)
NEUTROPHILS # BLD AUTO: 3.98 K/UL — SIGNIFICANT CHANGE UP (ref 1.8–7.4)
NEUTROPHILS NFR BLD AUTO: 9 % — LOW (ref 43–77)
NRBC # BLD: 0 /100 — SIGNIFICANT CHANGE UP (ref 0–0)
NRBC # BLD: SIGNIFICANT CHANGE UP /100 WBCS (ref 0–0)
PLAT MORPH BLD: NORMAL — SIGNIFICANT CHANGE UP
PLATELET # BLD AUTO: 75 K/UL — LOW (ref 150–400)
RBC # BLD: 3.78 M/UL — LOW (ref 4.2–5.8)
RBC # FLD: 13.9 % — SIGNIFICANT CHANGE UP (ref 10.3–14.5)
RBC BLD AUTO: SIGNIFICANT CHANGE UP
SMUDGE CELLS # BLD: PRESENT — SIGNIFICANT CHANGE UP
WBC # BLD: 44.23 K/UL — CRITICAL HIGH (ref 3.8–10.5)
WBC # FLD AUTO: 44.23 K/UL — CRITICAL HIGH (ref 3.8–10.5)

## 2023-04-19 PROCEDURE — 99213 OFFICE O/P EST LOW 20 MIN: CPT

## 2023-04-19 NOTE — RESULTS/DATA
[FreeTextEntry1] : CBC with diff done 4/19/23\par wbc- 44.23\par hgb- 12.6\par plt - 75\par alc- 35.38\par anc- 3.98

## 2023-04-19 NOTE — ASSESSMENT
[FreeTextEntry1] : Mr. Houston is a 82 year old male with CLL (diagnosed 8/2005), on ibrutnib (start 8/2015-2/2018). He is in rate controlled atrial fibrilation and his ibrutinib has been on hold since 2/8/18 secondary to this. He was started on acalabrutinib 100 mg BID 6/2019.  In March of 2020 patient stopped acalabrutinib when he developed a rash. He restarted acalabrutinib at full dose in 4/2021. He presents today for follow up. Locally he see's Dr. Deras his hematology oncologist in [place].  He presents today for follow up.\par \par Plan:\par 1)CBC reviewed; rising WBC/ALC - now at 44k/35.38\par -will send out labs for BTK resistance today\par - rx sent for Jaypirca to [employer] for cost check\par \par 2) continue with acabrutinib. 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 Deras contact: [phone]

## 2023-04-19 NOTE — PHYSICAL EXAM
[Fully active, able to carry on all pre-disease performance without restriction] : Status 0 - Fully active, able to carry on all pre-disease performance without restriction [Normal] : affect appropriate [de-identified] : AAOx3

## 2023-04-19 NOTE — HISTORY OF PRESENT ILLNESS
[Disease:__________________________] : Disease: [unfilled] [Cardiovascular] : Cardiovascular [Constitutional] : Constitutional [Endocrine] : Endocrine [Gastrointestinal] : Gastrointestinal [Genitourinary] : Genitourinary [Infectious] : Infectious [Musculoskeletal] : Musculoskeletal [Pain] : Pain [Pulmonary] : Pulmonary [Hematologic] : Hematologic [de-identified] : Mr. Houston is  a 82 year old male with CLL (diagnosed 8/2005), on ibrutinib since 8/2015, who presents for routine follow-up. His ibrutinib has been on hold since 2/8/18 secondary to atrial fibrillation. He has been on acalabrutinib 100 mg BID since 6/2019. In March of 2020 patient stopped acalabrutinib when he developed a rash. He restarted acalabrutinib at full dose in 4/2021. He presents today for follow up \par \par  He continues to see his local heme/onc Dr. Deras for monthly IVIG and follow up VT. No fevers or drenching night sweats. Good appetite. Weight overall stable.  No chest pain or shortness of breath. No palpitations - does f/u with cardiology for hx of a fib. No infections. No new LAD. Pt has recived 2 doses of the Evusheld.\par  [de-identified] : unmutated\par CD38+, ZAP70+ \par nl FISH [FreeTextEntry1] : \par Current treatment: acalbrutinib 100 mg x BID\par Prior Therapy:\par ibrutinib 8/2015-2/108 (on hold for a. fib)\par 4/07: Genitope vaccine trial\par FCR x 3 (5/10, 7/10, 12/10)-> clinical CR \par 5/12: Rituxan x 2 @ [facility], c/b tumor flare vs TLS.  ? ITP treated w steroids (off since 12/12)\par Current Tx: ibrutinib\par 8/13-2/14: Ofatumumab. Sabi x 1 added in 9/13.

## 2023-04-20 LAB
ALBUMIN SERPL ELPH-MCNC: 5.1 G/DL
ALP BLD-CCNC: 57 U/L
ALT SERPL-CCNC: 25 U/L
ANION GAP SERPL CALC-SCNC: 10 MMOL/L
AST SERPL-CCNC: 36 U/L
BILIRUB SERPL-MCNC: 0.8 MG/DL
BUN SERPL-MCNC: 14 MG/DL
CALCIUM SERPL-MCNC: 10.1 MG/DL
CHLORIDE SERPL-SCNC: 97 MMOL/L
CO2 SERPL-SCNC: 26 MMOL/L
CREAT SERPL-MCNC: 1.16 MG/DL
EGFR: 63 ML/MIN/1.73M2
GLUCOSE SERPL-MCNC: 100 MG/DL
LDH SERPL-CCNC: 235 U/L
MAGNESIUM SERPL-MCNC: 2 MG/DL
PHOSPHATE SERPL-MCNC: 4.7 MG/DL
POTASSIUM SERPL-SCNC: 5.6 MMOL/L
PROT SERPL-MCNC: 7.2 G/DL
SODIUM SERPL-SCNC: 133 MMOL/L
URATE SERPL-MCNC: 7.7 MG/DL

## 2023-04-21 LAB — MANUAL DIF COMMENT BLD-IMP: SIGNIFICANT CHANGE UP

## 2023-05-10 ENCOUNTER — NON-APPOINTMENT (OUTPATIENT)
Age: 83
End: 2023-05-10

## 2023-05-10 LAB
MISCELLANEOUS TEST: NORMAL
PROC NAME: NORMAL

## 2023-05-11 ENCOUNTER — NON-APPOINTMENT (OUTPATIENT)
Age: 83
End: 2023-05-11

## 2023-05-18 RX ORDER — ACALABRUTINIB 100 MG/1
100 TABLET, FILM COATED ORAL
Qty: 60 | Refills: 0 | Status: COMPLETED | COMMUNITY
Start: 2023-01-10 | End: 2023-05-18

## 2023-05-23 ENCOUNTER — NON-APPOINTMENT (OUTPATIENT)
Age: 83
End: 2023-05-23

## 2023-08-02 ENCOUNTER — RX RENEWAL (OUTPATIENT)
Age: 83
End: 2023-08-02

## 2023-10-24 RX ORDER — SULFAMETHOXAZOLE AND TRIMETHOPRIM 800; 160 MG/1; MG/1
800-160 TABLET ORAL
Qty: 36 | Refills: 2 | Status: ACTIVE | COMMUNITY
Start: 2018-01-29 | End: 1900-01-01

## 2024-01-05 RX ORDER — PIRTOBRUTINIB 100 MG/1
100 TABLET, COATED ORAL
Qty: 60 | Refills: 3 | Status: ACTIVE | COMMUNITY
Start: 2023-04-19 | End: 1900-01-01

## 2024-03-13 RX ORDER — FOLIC ACID 1 MG/1
1 TABLET ORAL DAILY
Qty: 90 | Refills: 3 | Status: ACTIVE | COMMUNITY
Start: 2021-06-22 | End: 1900-01-01

## 2024-04-23 RX ORDER — ACYCLOVIR 400 MG/1
400 TABLET ORAL
Qty: 180 | Refills: 3 | Status: DISCONTINUED | COMMUNITY
Start: 2017-02-03 | End: 2024-04-23